# Patient Record
Sex: FEMALE | Race: WHITE
[De-identification: names, ages, dates, MRNs, and addresses within clinical notes are randomized per-mention and may not be internally consistent; named-entity substitution may affect disease eponyms.]

---

## 2018-10-04 ENCOUNTER — HOSPITAL ENCOUNTER (EMERGENCY)
Dept: HOSPITAL 74 - JERFT | Age: 48
Discharge: HOME | End: 2018-10-04
Payer: SELF-PAY

## 2018-10-04 VITALS — SYSTOLIC BLOOD PRESSURE: 132 MMHG | HEART RATE: 80 BPM | TEMPERATURE: 98.4 F | DIASTOLIC BLOOD PRESSURE: 98 MMHG

## 2018-10-04 VITALS — BODY MASS INDEX: 33.3 KG/M2

## 2018-10-04 DIAGNOSIS — G56.01: Primary | ICD-10-CM

## 2018-10-04 PROCEDURE — 3E0233Z INTRODUCTION OF ANTI-INFLAMMATORY INTO MUSCLE, PERCUTANEOUS APPROACH: ICD-10-PCS

## 2018-10-04 NOTE — PDOC
History of Present Illness





- General


Chief Complaint: Pain, Acute


Stated Complaint: RT ARM PAIN


Time Seen by Provider: 10/04/18 18:48





Past History





- Past Medical History


Allergies/Adverse Reactions: 


 Allergies











Allergy/AdvReac Type Severity Reaction Status Date / Time


 


No Known Allergies Allergy   Verified 10/04/18 18:58











Home Medications: 


Ambulatory Orders





NK [No Known Home Medication]  10/04/18 








COPD: No


CHF: No





- Immunization History


Immunization Up to Date: Yes





- Suicide/Smoking/Psychosocial Hx


Smoking History: Never smoked


Hx Alcohol Use: No


Drug/Substance Use Hx: No


Substance Use Type: None





*Physical Exam





- Vital Signs


 Last Vital Signs











Temp Pulse Resp BP Pulse Ox


 


 98.4 F   80   16   132/98   100 


 


 10/04/18 18:58  10/04/18 18:58  10/04/18 18:58  10/04/18 18:58  10/04/18 18:58














*DC/Admit/Observation/Transfer


Diagnosis at time of Disposition: 


Carpal tunnel syndrome


Qualifiers:


 Laterality: right Qualified Code(s): G56.01 - Carpal tunnel syndrome, right 

upper limb








- Discharge Dispostion


Disposition: HOME


Condition at time of disposition: Stable


Decision to Admit order: No





- Referrals


Referrals: 


Refugio Pedroza MD [Primary Care Provider] - 


Ramo Ayala MD [Staff Physician] - 





- Patient Instructions


Printed Discharge Instructions:  DI for Carpal Tunnel Syndrome


Additional Instructions: 


Your testing today was negative for blood clots


Given the location of your pain, you may have carpal tunnel


Wear the wrist brace to keep the hand in a neutral position. Sleep with this on


You may take 800mg of Motrin every 6 hours as needed for pain not to exceed 3,

000mg a day


Follow up with orthopedics. A referral has been provided for you.





Return to the ED for any new or worsening symptoms





- Post Discharge Activity


Forms/Work/School Notes:  Back to Work

## 2018-10-04 NOTE — PDOC
Rapid Medical Evaluation


Chief Complaint: Pain, Acute


Time Seen by Provider: 10/04/18 18:48


Medical Evaluation: 





10/04/18 18:49


CC: right arm pain





HPI:  Pt is a 49 YO female with right arm pain and edema x 4 days.  Denies 

injury or trauma, denies hx of IV placement in this extremity.  Pt denies hx of 

DVTs.





I have performed a brief in- person evaluation of this patient.





Pertinent Physical Findings:


Skin: Clear


Lungs: Clear


Heart: RRR


MS. Right UE is larger than the left UE. Radial pulses present, cap refill less 

than 2 seconds, sensation intact.


Neuro: Alert


Psych: Appropriate affect





I have ordered: US of right UE.





The patient will proceed to: Main ED for further evaluation





**Discharge Disposition





- Diagnosis


 Arm edema








- Referrals





- Patient Instructions





- Post Discharge Activity